# Patient Record
Sex: FEMALE | Race: AMERICAN INDIAN OR ALASKA NATIVE | NOT HISPANIC OR LATINO | URBAN - METROPOLITAN AREA
[De-identification: names, ages, dates, MRNs, and addresses within clinical notes are randomized per-mention and may not be internally consistent; named-entity substitution may affect disease eponyms.]

---
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
## 2020-06-09 ENCOUNTER — APPOINTMENT (OUTPATIENT)
Dept: RADIOLOGY | Facility: IMAGING CENTER | Age: 20
End: 2020-06-09
Attending: PHYSICIAN ASSISTANT
Payer: COMMERCIAL

## 2020-06-09 ENCOUNTER — OFFICE VISIT (OUTPATIENT)
Dept: URGENT CARE | Facility: CLINIC | Age: 20
End: 2020-06-09
Payer: COMMERCIAL

## 2020-06-09 VITALS
HEART RATE: 73 BPM | WEIGHT: 120 LBS | RESPIRATION RATE: 16 BRPM | HEIGHT: 64 IN | BODY MASS INDEX: 20.49 KG/M2 | DIASTOLIC BLOOD PRESSURE: 76 MMHG | SYSTOLIC BLOOD PRESSURE: 110 MMHG | OXYGEN SATURATION: 100 % | TEMPERATURE: 99.4 F

## 2020-06-09 DIAGNOSIS — S52.592A OTHER CLOSED FRACTURE OF DISTAL END OF LEFT RADIUS, INITIAL ENCOUNTER: ICD-10-CM

## 2020-06-09 DIAGNOSIS — M25.532 LEFT WRIST PAIN: ICD-10-CM

## 2020-06-09 PROCEDURE — 99203 OFFICE O/P NEW LOW 30 MIN: CPT | Performed by: PHYSICIAN ASSISTANT

## 2020-06-09 PROCEDURE — 73110 X-RAY EXAM OF WRIST: CPT | Mod: TC,LT | Performed by: PHYSICIAN ASSISTANT

## 2020-06-09 ASSESSMENT — ENCOUNTER SYMPTOMS
FEVER: 0
TINGLING: 0
CHILLS: 0
FALLS: 1

## 2020-06-09 ASSESSMENT — FIBROSIS 4 INDEX: FIB4 SCORE: 0.33

## 2020-06-10 NOTE — PROGRESS NOTES
"  Subjective:   Emelina Hernandes is a 19 y.o. female who presents today with   Chief Complaint   Patient presents with   • Wrist Injury     (L) wrist-hurt today       Wrist Injury    The incident occurred 1 to 3 hours ago. [place] The injury mechanism was a fall. The pain is present in the left wrist. The quality of the pain is described as aching. The pain radiates to the left arm. Pertinent negatives include no tingling.   Patient was skateboarding when she fell back onto her outstretched left hand.  Patient states her wrist appeared out of place and she pulled on her left hand and felt it pop back in. She is now in significant pain.  PMH:  has a past medical history of Patient denies medical problems.  MEDS: No current outpatient medications on file.  ALLERGIES: No Known Allergies  SURGHX: History reviewed. No pertinent surgical history.  SOCHX:  reports that she has never smoked. She has never used smokeless tobacco. She reports current alcohol use. She reports current drug use.  FH: Reviewed with patient, not pertinent to this visit.       Review of Systems   Constitutional: Negative for chills and fever.   Musculoskeletal: Positive for falls.        Left wrist pain   Neurological: Negative for tingling.   All other systems reviewed and are negative.       Objective:   /76 (BP Location: Left arm)   Pulse 73   Temp 37.4 °C (99.4 °F) (Temporal)   Resp 16   Ht 1.613 m (5' 3.5\")   Wt 54.4 kg (120 lb)   LMP 05/28/2020 (Approximate)   SpO2 100%   BMI 20.92 kg/m²   Physical Exam  Vitals signs and nursing note reviewed.   Constitutional:       General: She is not in acute distress.     Appearance: Normal appearance. She is well-developed and normal weight.   HENT:      Head: Normocephalic and atraumatic.      Right Ear: Hearing normal.      Left Ear: Hearing normal.   Eyes:      Pupils: Pupils are equal, round, and reactive to light.   Cardiovascular:      Rate and Rhythm: Normal " rate and regular rhythm.      Heart sounds: Normal heart sounds.   Pulmonary:      Effort: Pulmonary effort is normal.      Breath sounds: Normal breath sounds.   Musculoskeletal:      Left wrist: She exhibits decreased range of motion, tenderness, bony tenderness and swelling.      Comments: Decreased ROM in left wrist. TTP to Radial aspect of left wrist. NVI distally in digits of left hand. No TTP to anatomic snuffbox or other aspect of the left hand. Good  strength in bilateral upper extremities. Full ROM and no TTP to left elbow.  Full ROM in digits of left hand.    Skin:     General: Skin is warm and dry.   Neurological:      Mental Status: She is alert.      Coordination: Coordination normal.   Psychiatric:         Mood and Affect: Mood normal.       DX WRIST  FINDINGS:  Bone mineralization is normal.  Transverse impacted and displaced acute fracture of the distal radial metaphysis is identified. Distal fragment is displaced dorsally relative to the proximal fragment.  No other fractures are identified. There   is soft tissue swelling.     IMPRESSION:     Transverse impacted displaced acute fracture of the distal radius is identified.    Assessment/Plan:   Assessment    1. Left wrist pain  - DX-WRIST-COMPLETE 3+ LEFT; Future    2. Other closed fracture of distal end of left radius, initial encounter  - REFERRAL TO ORTHOPEDICS  [facility] [place] glass splint and urgent Ortho follow up for further evaluation and care.   Rest, ice, elevate.  ER precautions given with any new numbness or tingling. Patient will use OTC analgesia.  Differential diagnosis, natural history, supportive care, and indications for immediate follow-up discussed.   Patient given instructions and understanding of medications and treatment.    If not improving in 3-5 days, F/U with PCP or return to  if symptoms worsen.    Patient agreeable to plan.      Please note that this dictation was created using voice recognition software. I have  made every reasonable attempt to correct obvious errors, but I expect that there are errors of grammar and possibly content that I did not discover before finalizing the note.    Jeremias Ya PA-C